# Patient Record
Sex: FEMALE | Race: WHITE
[De-identification: names, ages, dates, MRNs, and addresses within clinical notes are randomized per-mention and may not be internally consistent; named-entity substitution may affect disease eponyms.]

---

## 2017-01-08 ENCOUNTER — HOSPITAL ENCOUNTER (EMERGENCY)
Dept: HOSPITAL 61 - ER | Age: 22
Discharge: HOME | End: 2017-01-08
Payer: SELF-PAY

## 2017-01-08 VITALS — SYSTOLIC BLOOD PRESSURE: 153 MMHG | DIASTOLIC BLOOD PRESSURE: 79 MMHG

## 2017-01-08 VITALS — HEIGHT: 63 IN | BODY MASS INDEX: 40.75 KG/M2 | WEIGHT: 230 LBS

## 2017-01-08 DIAGNOSIS — B34.9: Primary | ICD-10-CM

## 2017-01-08 DIAGNOSIS — R05: ICD-10-CM

## 2017-01-08 PROCEDURE — 87880 STREP A ASSAY W/OPTIC: CPT

## 2017-01-08 PROCEDURE — 87070 CULTURE OTHR SPECIMN AEROBIC: CPT

## 2017-01-08 PROCEDURE — 99283 EMERGENCY DEPT VISIT LOW MDM: CPT

## 2017-01-08 NOTE — PHYS DOC
Past Medical History


Past Medical History:  No Pertinent History


Past Surgical History:  Other


Additional Past Surgical Histo:  TUMOR REMOVED


Alcohol Use:  None


Drug Use:  None





Adult General


Chief Complaint


Chief Complaint:  COUGH





HPI


HPI


Patient is a 21  year old female who presents with a cough and sore throat 

intermittently for two weeks. States she originally felt better late last week 

and then symptoms wrosened again this weekend. Took Dayquil prior to arrival. 

Denies fever





Review of Systems


Review of Systems





Constitutional: Denies fever or chills 


Eyes: Denies change in visual acuity, redness, or eye pain 


HENT: Nasal congestion and sore throat


Respiratory: Denies  shortness of breath. Cough 


Cardiovascular: No additional information not addressed in HPI 


GI: Denies abdominal pain, nausea, vomiting, bloody stools or diarrhea 


: Denies dysuria or hematuria 


Musculoskeletal: Denies back pain or joint pain


Integument: Denies rash or skin lesions 


Neurologic: Denies headache, focal weakness or sensory changes 


Endocrine: Denies polyuria or polydipsia []





Allergies


Allergies





 Allergies








Coded Allergies Type Severity Reaction Last Updated Verified


 


  No Known Drug Allergies    7/6/15 No











Physical Exam


Physical Exam





Constitutional: Well developed, well nourished, no acute distress, non-toxic 

appearance. 


HENT: Normocephalic, atraumatic, bilateral external ears normal, oropharynx 

moist, nose normal. Oropharynx erythematous with exudates


Eyes: PERRLA, EOMI, conjunctiva normal, no discharge.  


Neck: Normal range of motion, no tenderness, supple, no stridor. 


Cardiovascular:Heart rate regular rhythm, no murmur 


Lungs & Thorax:  Bilateral breath sounds clear to auscultation


Abdomen: Bowel sounds normal, soft, no tenderness, no masses, no pulsatile 

masses. 


Skin: Warm, dry, no erythema, no rash.  


Back: No tenderness, no CVA tenderness. 


Extremities: No tenderness, no cyanosis, no clubbing, ROM intact, no edema.


Neurologic: Alert and oriented X 3, normal motor function, normal sensory 

function, no focal deficits noted. 


Psychologic: Affect normal, judgement normal, mood normal. []





Current Patient Data


Vital Signs





 Vital Signs








  Date Time  Temp Pulse Resp B/P Pulse Ox O2 Delivery O2 Flow Rate FiO2


 


1/8/17 19:41 98.7 118 18  95 Room Air  





 98.7       











EKG


EKG


[]





Radiology/Procedures


Radiology/Procedures


[]


Impressions:


1. Viral illness





Course & Med Decision Making


Course & Med Decision Making


Pertinent Labs and Imaging studies reviewed. (See chart for details)





[]





Dragon Disclaimer


Dragon Disclaimer


This electronic medical record was generated, in whole or in part, using a 

voice recognition dictation system.





Departure


Departure


Impression:  


 Primary Impression:  


 Viral illness


Disposition:  01 HOME, SELF-CARE


Condition:  STABLE


Referrals:  


NO PCP (PCP)


Patient Instructions:  Viral Infections, Easy-To-Read





Additional Instructions:


1. Take medication as prescribed.


2. Return if problems or concerns


3. Follow up with primary in 1-2 days


Scripts


Benzonatate (Tessalon Perle)100 Mg [id number] Mg PO TID PRN COUGH #20 CAP


   Prov:YVONNE KOCH         1/8/17


Guaifenesin (Mucinex)600 Mg Tablet.er1 Tab PO BID #14 TAB


   Prov:YVONNE KOCH         1/8/17








YVONNE KOCH Jan 8, 2017 20:20

## 2017-01-09 LAB
NEGATIVE OBC STREP: (no result)
POSITIVE OBC STREP: (no result)
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
## 2020-07-26 NOTE — PHYS DOC
Past Medical History


Past Medical History:  No Pertinent History


Past Surgical History:  Other


Additional Past Surgical Histo:  TUMOR REMOVED


Smoking Status:  Never Smoker


Alcohol Use:  Rarely


Drug Use:  None





General Adult


EDM:


Chief Complaint:  FACE PAIN





HPI:


HPI:





Patient is a 24  year old female who just prior to arrival had a fall onto 

concrete and injured her nose.  Patient did not have loss of conscious.  Patient

states is mechanical fall and did not have syncope prior to the fall.  Patient 

denies any nausea vomiting visual changes.  Patient complains of 6 out of 10 

throbbing pain to her knee.





Review of Systems:


Review of Systems:


Constitutional:  Denies fever or chills 


Eyes:  Denies change in visual acuity 


HENT:  Denies sore throat 


Respiratory:  Denies cough or shortness of breath 


Cardiovascular:  Denies chest pain or edema 


GI:  Denies abdominal pain, nausea, vomiting, or diarrhea 


: Denies dysuria 


Musculoskeletal:  Denies back pain or joint pain 


Integument:  Denies rash 


Neurologic:  Denies headache or focal weakness 


Psychiatric:  Denies depression or anxiety





Heart Score:


Risk Factors:


Risk Factors:  DM, Current or recent (<one month) smoker, HTN, HLP, family 

history of CAD, obesity.


Risk Scores:


Score 0 - 3:  2.5% MACE over next 6 weeks - Discharge Home


Score 4 - 6:  20.3% MACE over next 6 weeks - Admit for Clinical Observation


Score 7 - 10:  72.7% MACE over next 6 weeks - Early Invasive Strategies





Current Medications:





Current Medications








 Medications


  (Trade)  Dose


 Ordered  Sig/Jd  Start Time


 Stop Time Status Last Admin


Dose Admin


 


 Acetaminophen/


 Hydrocodone Bitart


  (Lortab 7.5/325)  1 tab  1X  ONCE  20 02:00


 20 02:01 UNV  














Allergies:


Allergies:





Allergies








Coded Allergies Type Severity Reaction Last Updated Verified


 


  No Known Drug Allergies    7/6/15 No











Physical Exam:


PE:


Constitutional: Well developed, well nourished, no acute distress, non-toxic 

appearance. 


HENT: Mild swelling and bruising to the bridge of the nose.  No septal hematoma.

 Oropharynx is clear.  No malocclusion


Eyes: Conjunctiva clear, EOMI


Neck: Normal range of motion, no tenderness, supple, no stridor. 


Cardiovascular: Regular rate/rhythm, peripheral pulse intact, CRT intact


Lungs & Thorax:  No respiratory distress


Abdomen: No distension


Skin: Diffuse:  Intact, no rash


Back: Full ROM


Extremities: Normal inspection, no edema 


Neurologic: Alert and oriented X 3, normal motor function, , no focal deficits 

noted. 


Psychologic: Affect normal, judgement normal, mood normal.





EKG:


EKG:


[]





Radiology/Procedures:


Radiology/Procedures:


[][facility]


                    [address] [place] [address]


                                 [phone]


                                        


                                 IMAGING REPORT





                                     Signed





PATIENT: DAPHNE MARTINEZ     ACCOUNT: [id number]     MRN#: [id number]


: 1995           LOCATION: ER              AGE: 24


SEX: F                    EXAM DT: 20         ACCESSION#: [id number]


STATUS: REG ER            ORD. PHYSICIAN: RASHI CURRY MD


REASON: fall


PROCEDURE: NASAL BONES 3+V





Nasal bones 3 views:


 


Reason for examination: Fell.


 


There appear to be fractures of the nasal bones with no displacement. 


Paranasal sinuses are clear.


 


IMPRESSION:


 


Nondisplaced fractures of the nasal bones.


 


Electronically signed by: Claudia Appiah MD (2020 3:11 AM) [id number]














DICTATED and SIGNED BY:     CLAUDIA APPIAH MD


DATE:     20





Course & Med Decision Making:


Course & Med Decision Making


Pertinent Labs and Imaging studies reviewed. (See chart for details)





[] 24-year-old female presents with a ground-level fall.  Patient has swelling 

to her nose.  No septal hematoma.  X-ray reveals a nasal fracture.  Discussed 

with patient's not blowing the nose ice to the nose and pain meds as needed.





Dragon Disclaimer:


Dragon Disclaimer:


This electronic medical record was generated, in whole or in part, using a voice

 recognition dictation system.





Departure


Departure


Impression:  


   Primary Impression:  


   Nasal fracture


Disposition:  01 HOME, SELF-CARE


Condition:  STABLE


Referrals:  


NO PCP (PCP)








ent


2-3 days


Patient Instructions:  Nasal Fracture





Additional Instructions:  


EMERGENCY DEPARTMENT GENERAL DISCHARGE INSTRUCTIONS





THANK YOU for coming to [facility] Emergency Department (ED) to

day and 


trusting us with your care.  We trust that you had a positive experience in our 

Emergency 


Department. If you wish to speak to the department Management you can contact 

the department 


Director at [phone].








YOUR FOLLOW UP INSTRUCTIONS ARE AS FOLLOWS: 





Do you have a private doctor? If you do not have a private doctor, please ask 

for a resource 


list of physicians or clinics that may be able to assist you with follow up 

care.  





The Emergency Physician has interpreted your x-rays. The X-ray specialist will 

also review 


them. If there is a change in the findings you will be notified in 48 hours when

 at all 


possible. 





A lab test or lab culture may have been done, your results will be reviewed and 

you will be 


notified if you need a change in treatment. 








ADDITIONAL INSTRUCTIONS AND INFORMATION 





Your care today has been supervised by a physician who is specially trained in 

emergency 


care. Many problems require more than one evaluation for a complete diagnosis 

and treatment. 


We recommend that you schedule your follow up appointment as recommended to 

ensure complete 


treatment of your illness or injury.  If you are unable to obtain follow up care

 and 


continue to have a problem, or if your condition worsens we recommend that you 

return to the 


ED. 





We are not able to safely determine your condition over the phone nor are we 

able to give 


sound medical advice over the phone. For these safety reasons, if you call for 

medical 


advice we will ask you to come to the ED for further evaluation





If you have any questions regarding these discharge instructions please call the

 ED at [phone].





SAFETY INFORMATION





In the interest of safety, wellness, and injury prevention; we encourage you to 

wear your 


seatbelt, if you smoke; quit smoking, and we encourage your family to use 

protective helmet 


for bicycling and other sporting events that present an increased risk for head 

injury. 





IF YOUR SYMPTOMS WORSEN OR NEW SYMPTOMS DEVELOP, OR YOU HAVE CONCERNS ABOUT YOUR

 CONDITION; 


OR IF YOUR CONDITION WORSENS WHILE YOU ARE WAITING FOR YOUR FOLLOW UP 

APPOINTMENT;  EITHER  


CONTACT YOUR PRIMARY CARE DOCTOR, THE PHYSICIAN WHOSE NAME AND NUMBER YOU WERE 

GIVEN,  OR 


RETURN TO THE  ED IMMEDIATELY.


Scripts


Hydrocodone/Apap 5-325 (NORCO 5-325 TABLET) 1 Each Tablet


1-2 EACH PO PRN Q6HRS PRN for PAIN, #12


   as needed for pain


   Prov: RASHI CURRY MD         20





Justicifation of Admission Dx:


Justifications for Admission:


Justification of Admission Dx:  N/A











RASHI CURRY MD              2020 01:54

## 2020-07-26 NOTE — RAD
Nasal bones 3 views:

 

Reason for examination: Fell.

 

There appear to be fractures of the nasal bones with no displacement. 

Paranasal sinuses are clear.

 

IMPRESSION:

 

Nondisplaced fractures of the nasal bones.

 

Electronically signed by: Claudia Stewart MD (7/26/2020 3:11 AM) [id number]